# Patient Record
Sex: FEMALE | Race: WHITE | NOT HISPANIC OR LATINO | Employment: UNEMPLOYED | URBAN - METROPOLITAN AREA
[De-identification: names, ages, dates, MRNs, and addresses within clinical notes are randomized per-mention and may not be internally consistent; named-entity substitution may affect disease eponyms.]

---

## 2020-06-30 PROBLEM — R00.1 BRADYCARDIA: Status: ACTIVE | Noted: 2020-06-30

## 2020-06-30 PROBLEM — J43.1 PANLOBULAR EMPHYSEMA: Status: ACTIVE | Noted: 2019-04-23

## 2020-06-30 PROBLEM — E07.9 THYROID DISEASE: Status: ACTIVE | Noted: 2020-06-30

## 2020-06-30 PROBLEM — E78.5 HYPERLIPIDEMIA: Status: ACTIVE | Noted: 2020-06-30

## 2020-06-30 PROBLEM — F41.9 ANXIETY: Status: ACTIVE | Noted: 2020-06-30

## 2020-06-30 PROBLEM — R07.89 ATYPICAL CHEST PAIN: Status: ACTIVE | Noted: 2017-11-28

## 2020-06-30 PROBLEM — M17.11 OSTEOARTHRITIS OF RIGHT KNEE: Status: ACTIVE | Noted: 2019-04-23

## 2020-06-30 PROBLEM — M54.9 CHRONIC BACK PAIN: Status: ACTIVE | Noted: 2020-06-30

## 2020-06-30 PROBLEM — Z95.0 CARDIAC PACEMAKER IN SITU: Status: ACTIVE | Noted: 2020-06-30

## 2020-06-30 PROBLEM — J44.9 COPD (CHRONIC OBSTRUCTIVE PULMONARY DISEASE): Status: ACTIVE | Noted: 2020-06-30

## 2020-06-30 PROBLEM — I10 ESSENTIAL HYPERTENSION: Status: ACTIVE | Noted: 2020-06-30

## 2020-06-30 PROBLEM — I35.1 NONRHEUMATIC AORTIC VALVE INSUFFICIENCY: Status: ACTIVE | Noted: 2019-11-25

## 2020-06-30 PROBLEM — M85.80 OSTEOPENIA: Status: ACTIVE | Noted: 2019-08-19

## 2020-06-30 PROBLEM — F20.9 SCHIZOPHRENIA: Status: ACTIVE | Noted: 2019-04-23

## 2020-06-30 PROBLEM — F32.A DEPRESSION: Status: ACTIVE | Noted: 2020-06-30

## 2020-06-30 PROBLEM — R00.1 SYMPTOMATIC SINUS BRADYCARDIA: Status: ACTIVE | Noted: 2020-06-30

## 2020-06-30 PROBLEM — G89.29 CHRONIC BACK PAIN: Status: ACTIVE | Noted: 2020-06-30

## 2021-01-13 PROBLEM — R00.1 SYMPTOMATIC SINUS BRADYCARDIA: Status: RESOLVED | Noted: 2020-06-30 | Resolved: 2021-01-13

## 2021-01-13 PROBLEM — R00.1 BRADYCARDIA: Status: RESOLVED | Noted: 2020-06-30 | Resolved: 2021-01-13

## 2021-01-13 PROBLEM — I35.1 NONRHEUMATIC AORTIC VALVE INSUFFICIENCY: Status: RESOLVED | Noted: 2019-11-25 | Resolved: 2021-01-13

## 2023-07-10 ENCOUNTER — TELEPHONE (OUTPATIENT)
Dept: PHARMACY | Facility: CLINIC | Age: 73
End: 2023-07-10
Payer: MEDICAID

## 2023-07-10 NOTE — TELEPHONE ENCOUNTER
Gerda, this is Mariana Yap, clinical pharmacist with [facility] that is part of your care team.  We have begun working on your prescription that your doctor has sent us. Our next steps include:     Working with your insurance company to obtain approval for your medication  Working with you to ensure your medication is affordable     We will be calling you along the way with updates on your medication but if you have any concerns or receive information that you would like to discuss please reach us at [phone].    Welcome call outcome: No answer/Unable to leave voicemail

## 2023-07-17 ENCOUNTER — SPECIALTY PHARMACY (OUTPATIENT)
Dept: PHARMACY | Facility: CLINIC | Age: 73
End: 2023-07-17
Payer: MEDICAID

## 2023-07-20 ENCOUNTER — SPECIALTY PHARMACY (OUTPATIENT)
Dept: PHARMACY | Facility: CLINIC | Age: 73
End: 2023-07-20
Payer: MEDICAID

## 2023-07-20 DIAGNOSIS — G24.01 TARDIVE DYSKINESIA: Primary | ICD-10-CM

## 2023-07-20 NOTE — TELEPHONE ENCOUNTER
Specialty Pharmacy - Initial Clinical Assessment    Specialty Medication Orders Linked to Encounter      Flowsheet Row Most Recent Value   Medication #1 valbenazine (INGREZZA) 40 mg Cap (Order#[id number], Rx#[id number])          Patient Diagnosis   G24.01 - Tardive dyskinesia    Subjective    Yaa Ochoa is a 72 y.o. female, who is followed by the specialty pharmacy service for management and education.    Recent Encounters       Date Type Provider Description    2023 Specialty Pharmacy Mariana Yap, PharmD Initial Clinical Assessment    2023 Specialty Pharmacy Mariana Yap, PharmD Referral Authorization            Current Outpatient Medications   Medication Sig    albuterol (PROVENTIL) 2.5 mg /3 mL (0.083 %) nebulizer solution USE 1 VIAL BY NEBULIZATION four times a day as needeD FOR wheezing    albuterol-ipratropium (DUO-NEB) 2.5 mg-0.5 mg/3 mL nebulizer solution ipratropium 0.5 mg-albuterol 3 mg (2.5 mg base)/3 mL nebulization soln   Inhale 3 ml 4 times daily as needed    aspirin (ECOTRIN) 81 MG EC tablet aspirin 81 mg tablet,delayed release   Take 1 tablet every day by oral route.    baclofen (LIORESAL) 20 MG tablet Take 20 mg by mouth.    benzonatate (TESSALON) 200 MG capsule Take 200 mg by mouth.    BREO ELLIPTA 200-25 mcg/dose DsDv diskus inhaler 1 puff once daily.    buPROPion (WELLBUTRIN XL) 150 MG TB24 tablet 300 mg once daily.    busPIRone (BUSPAR) 5 MG Tab Take 5 mg by mouth 2 (two) times daily.    celecoxib (CELEBREX) 200 MG capsule Take 200 mg by mouth daily as needed.    CLEARLAX 17 gram/dose powder SMARTSI Gram(s) By Mouth Daily PRN    DALIRESP 500 mcg Tab     dextromethorphan HBr 5 mg/5 mL Syrp Take by mouth.    diclofenac sodium (VOLTAREN) 1 % Gel Apply 2 g topically 2 (two) times daily as needed.    diphenhydrAMINE (BENADRYL) 25 mg capsule Take 25 mg by mouth every 6 (six) hours as needed.    donepeziL (ARICEPT) 10 MG tablet once daily.    doxycycline (VIBRAMYCIN) 100 MG Cap Take  100 mg by mouth 2 (two) times daily.    escitalopram oxalate (LEXAPRO) 20 MG tablet Take 20 mg by mouth once daily.    fluticasone propionate (FLONASE) 50 mcg/actuation nasal spray fluticasone propionate 50 mcg/actuation nasal spray,suspension    furosemide (LASIX) 40 MG tablet furosemide 40 mg tablet    gabapentin (NEURONTIN) 800 MG tablet Take 600 mg by mouth 3 (three) times daily.    HYDROcodone-acetaminophen (NORCO)  mg per tablet hydrocodone 10 mg-acetaminophen 325 mg tablet   TAKE ONE TABLET BY MOUTH EVERY DAY AS NEEDED    ipratropium (ATROVENT) 0.02 % nebulizer solution Use 1 vial with Albuterol in nebulizer every 4 hours as needed    lamoTRIgine (LAMICTAL) 100 MG tablet     levocetirizine (XYZAL) 5 MG tablet Take 5 mg by mouth once daily.    levothyroxine (SYNTHROID) 75 MCG tablet Take 75 mcg by mouth once daily.    NICOTROL 10 mg Crtg SMARTSIG:Via Inhaler Daily PRN    omeprazole (PRILOSEC) 20 MG capsule     ondansetron (ZOFRAN) 8 MG tablet Take 8 mg by mouth every 8 (eight) hours as needed.    potassium chloride SA (K-DUR,KLOR-CON) 20 MEQ tablet     simvastatin (ZOCOR) 20 MG tablet     tiZANidine 4 mg Cap Take by mouth.    valbenazine (INGREZZA) 40 mg Cap Take 1 capsule (40 mg) by mouth once daily.    ziprasidone (GEODON) 80 MG capsule Take 80 mg by mouth 2 (two) times a day.   Last reviewed on 7/5/2023  3:33 PM by Chanda Couch RN    Review of patient's allergies indicates:   Allergen Reactions    Povidone-iodine Itching and Rash   Last reviewed on  7/20/2023 3:50 PM by Mariana Yap          Assessment Questions - Documented Responses      Flowsheet Row Most Recent Value   Assessment    Medication Reconciliation completed for patient Yes   During the past 4 weeks, has patient missed any activities due to condition or medication? No   During the past 4 weeks, did patient have any of the following urgent care visits? None   Goals of Therapy Status Discussed (new start)   Status of the patients  ability to self-administer: Is Able   All education points have been covered with patient? Yes, supplemental printed education provided   Welcome packet contents reviewed and discussed with patient? Yes   Assesment completed? Yes   Plan Therapy being initiated   Do you need to open a clinical intervention (i-vent)? No   Do you want to schedule first shipment? Yes   Medication #1 Assessment Info    Patient status New medication, New to [facility]   Is this medication appropriate for the patient? No   Is this medication effective? Not yet started          Refill Questions - Documented Responses      Flowsheet Row Most Recent Value   Patient Availability and HIPAA Verification    Does patient want to proceed with activity? Yes   HIPAA/medical authority confirmed? Yes   Relationship to patient of person spoken to? Self   Refill Screening Questions    When does the patient need to receive the medication? 07/25/23   Refill Delivery Questions    How will the patient receive the medication? Mail   When does the patient need to receive the medication? 07/25/23   Shipping Address Home   Address in [facility] confirmed and updated if neccessary? Yes   Expected Copay ($) 0   Is the patient able to afford the medication copay? Yes   Payment Method zero copay   Days supply of Refill 30   Supplies needed? No supplies needed   Refill activity completed? Yes   Refill activity plan Refill scheduled   Shipment/Pickup Date: 07/24/23            Objective    She has a past medical history of Anxiety (6/30/2020), Bradycardia (6/30/2020), Cardiac pacemaker in situ (6/30/2020), COPD (chronic obstructive pulmonary disease) (6/30/2020), Depression (6/30/2020), Essential hypertension (6/30/2020), Hyperlipidemia (6/30/2020), Panlobular emphysema (4/23/2019), S/P placement of cardiac pacemaker (1/25/2016), Schizophrenia (4/23/2019), and Thyroid disease (6/30/2020).        BP Readings from Last 4 Encounters:   07/05/23 139/65   11/30/22 132/67  "  01/13/22 (!) 130/59   07/13/21 136/63     Ht Readings from Last 4 Encounters:   11/30/22 5' 4" (1.626 m)   07/13/21 5' 4" (1.626 m)   06/27/21 5' 4" (1.626 m)   01/13/21 5' 4" (1.626 m)     Wt Readings from Last 4 Encounters:   07/05/23 73 kg (161 lb)   11/30/22 75.8 kg (167 lb)   01/13/22 72.6 kg (160 lb)   07/13/21 72.6 kg (160 lb)       The goals of prescribed drug therapy management include:  Supporting patient to meet the prescriber's medical treatment objectives  Improving or maintaining quality of life  Maintaining optimal therapy adherence  Minimizing and managing side effects      Goals of Therapy Status: Discussed (new start)    Assessment/Plan  Patient plans to start therapy on 07/25/23      Indication, dosage, appropriateness, effectiveness, safety and convenience of her specialty medication(s) were reviewed today.     Patient Education   Patient received education on the following:   Expectations and possible outcomes of therapy  Proper use, timely administration, and missed dose management  Duration of therapy  Side effects, including prevention, minimization, and management  Contraindications and safety precautions  New or changed medications, including prescribe and over the counter medications and supplements  Reviews recommended vaccinations, as appropriate  Storage, safe handling, and disposal    Confirmed with NP, starting pt out at 40 mg once daily and continue with 40 mg once daily to determine how pt responds    Tasks added this encounter   No tasks added.   Tasks due within next 3 months   7/25/2023 - Initial Clinical Assessment/Patient Education (1 Time Occurence)  7/17/2023 - Set up Initial Fill     Mariana Yap, PharmD  [facility]  [address] [place] [id number]  Phone: [phone]  Fax: [phone]  "

## 2023-08-16 ENCOUNTER — SPECIALTY PHARMACY (OUTPATIENT)
Dept: PHARMACY | Facility: CLINIC | Age: 73
End: 2023-08-16
Payer: MEDICAID

## 2023-08-16 NOTE — TELEPHONE ENCOUNTER
Patient just started therapy one month on Ingrezza. Pt stated has been able to see her handwriting, however, still shaky. Informed pt to continue medication for a few more months to see any changes. However, seems like medication is working as pt is able to see her handwriting now. Will continue to monitor

## 2023-08-16 NOTE — TELEPHONE ENCOUNTER
Specialty Pharmacy - Refill Coordination    Specialty Medication Orders Linked to Encounter      Flowsheet Row Most Recent Value   Medication #1 valbenazine (INGREZZA) 40 mg Cap (Order#[id number], Rx#[id number])            Refill Questions - Documented Responses      Flowsheet Row Most Recent Value   Patient Availability and HIPAA Verification    Does patient want to proceed with activity? Yes   HIPAA/medical authority confirmed? Yes   Relationship to patient of person spoken to? Self   Refill Screening Questions    Changes to allergies? No   Changes to medications? No   New conditions since last clinic visit? No   Unplanned office visit, urgent care, ED, or hospital admission in the last 4 weeks? No   How does patient/caregiver feel medication is working? Good   Financial problems or insurance changes? No   How many doses of your specialty medications were missed in the last 4 weeks? 0   Would patient like to speak to a pharmacist? No   When does the patient need to receive the medication? 08/22/23   Refill Delivery Questions    How will the patient receive the medication? Mail   When does the patient need to receive the medication? 08/22/23   Shipping Address Home   Address in [facility] confirmed and updated if neccessary? Yes   Expected Copay ($) 0   Is the patient able to afford the medication copay? Yes   Payment Method zero copay   Days supply of Refill 30   Supplies needed? No supplies needed   Refill activity plan Refill scheduled   Shipment/Pickup Date: 08/17/23            Current Outpatient Medications   Medication Sig    albuterol (PROVENTIL) 2.5 mg /3 mL (0.083 %) nebulizer solution USE 1 VIAL BY NEBULIZATION four times a day as needeD FOR wheezing    albuterol-ipratropium (DUO-NEB) 2.5 mg-0.5 mg/3 mL nebulizer solution ipratropium 0.5 mg-albuterol 3 mg (2.5 mg base)/3 mL nebulization soln   Inhale 3 ml 4 times daily as needed    aspirin (ECOTRIN) 81 MG EC tablet aspirin 81 mg tablet,delayed  release   Take 1 tablet every day by oral route.    baclofen (LIORESAL) 20 MG tablet Take 20 mg by mouth.    benzonatate (TESSALON) 200 MG capsule Take 200 mg by mouth.    BREO ELLIPTA 200-25 mcg/dose DsDv diskus inhaler 1 puff once daily.    buPROPion (WELLBUTRIN XL) 150 MG TB24 tablet 300 mg once daily.    busPIRone (BUSPAR) 5 MG Tab Take 5 mg by mouth 2 (two) times daily.    celecoxib (CELEBREX) 200 MG capsule Take 200 mg by mouth daily as needed.    CLEARLAX 17 gram/dose powder SMARTSI Gram(s) By Mouth Daily PRN    DALIRESP 500 mcg Tab     dextromethorphan HBr 5 mg/5 mL Syrp Take by mouth.    diclofenac sodium (VOLTAREN) 1 % Gel Apply 2 g topically 2 (two) times daily as needed.    diphenhydrAMINE (BENADRYL) 25 mg capsule Take 25 mg by mouth every 6 (six) hours as needed.    donepeziL (ARICEPT) 10 MG tablet once daily.    doxycycline (VIBRAMYCIN) 100 MG Cap Take 100 mg by mouth 2 (two) times daily.    escitalopram oxalate (LEXAPRO) 20 MG tablet Take 20 mg by mouth once daily.    fluticasone propionate (FLONASE) 50 mcg/actuation nasal spray fluticasone propionate 50 mcg/actuation nasal spray,suspension    furosemide (LASIX) 40 MG tablet furosemide 40 mg tablet    gabapentin (NEURONTIN) 800 MG tablet Take 600 mg by mouth 3 (three) times daily.    HYDROcodone-acetaminophen (NORCO)  mg per tablet hydrocodone 10 mg-acetaminophen 325 mg tablet   TAKE ONE TABLET BY MOUTH EVERY DAY AS NEEDED    ipratropium (ATROVENT) 0.02 % nebulizer solution Use 1 vial with Albuterol in nebulizer every 4 hours as needed    lamoTRIgine (LAMICTAL) 100 MG tablet     levocetirizine (XYZAL) 5 MG tablet Take 5 mg by mouth once daily.    levothyroxine (SYNTHROID) 75 MCG tablet Take 75 mcg by mouth once daily.    NICOTROL 10 mg Crtg SMARTSIG:Via Inhaler Daily PRN    omeprazole (PRILOSEC) 20 MG capsule     ondansetron (ZOFRAN) 8 MG tablet Take 8 mg by mouth every 8 (eight) hours as needed.    potassium chloride SA (K-DUR,KLOR-CON) 20  MEQ tablet     simvastatin (ZOCOR) 20 MG tablet     tiZANidine 4 mg Cap Take by mouth.    valbenazine (INGREZZA) 40 mg Cap Take 1 capsule (40 mg) by mouth once daily.    ziprasidone (GEODON) 80 MG capsule Take 80 mg by mouth 2 (two) times a day.   Last reviewed on 7/5/2023  3:33 PM by Chanda Couch RN    Review of patient's allergies indicates:   Allergen Reactions    Povidone-iodine Itching and Rash    Last reviewed on  7/20/2023 3:50 PM by Mariana Yap      Tasks added this encounter   No tasks added.   Tasks due within next 3 months   8/16/2023 - Refill Coordination Outreach (1 time occurrence)     [facility]  [address] [facility] [address]  Phone: [phone]  Fax: [phone]

## 2023-08-16 NOTE — TELEPHONE ENCOUNTER
Outgoing call regarding Ingrezza refill, pt wants to know how it takes to quite shaking. Transferred to assigned [facility].

## 2024-02-05 PROBLEM — G24.01 TARDIVE DYSKINESIA: Status: ACTIVE | Noted: 2024-02-05
